# Patient Record
Sex: MALE | Race: WHITE | NOT HISPANIC OR LATINO | Employment: FULL TIME | URBAN - METROPOLITAN AREA
[De-identification: names, ages, dates, MRNs, and addresses within clinical notes are randomized per-mention and may not be internally consistent; named-entity substitution may affect disease eponyms.]

---

## 2020-11-19 PROBLEM — R10.9 ABDOMINAL BLOATING WITH CRAMPS: Status: ACTIVE | Noted: 2020-11-19

## 2020-11-19 PROBLEM — K21.9 GASTROESOPHAGEAL REFLUX DISEASE WITHOUT ESOPHAGITIS: Status: ACTIVE | Noted: 2020-11-19

## 2020-11-19 PROBLEM — J45.40 MODERATE PERSISTENT ASTHMA WITHOUT COMPLICATION: Status: ACTIVE | Noted: 2020-11-19

## 2020-11-19 PROBLEM — R14.0 ABDOMINAL BLOATING WITH CRAMPS: Status: ACTIVE | Noted: 2020-11-19

## 2020-11-19 PROBLEM — K62.5 RECTAL BLEEDING: Status: ACTIVE | Noted: 2020-11-19

## 2020-11-19 PROBLEM — J30.89 ENVIRONMENTAL AND SEASONAL ALLERGIES: Status: ACTIVE | Noted: 2020-11-19

## 2022-02-01 PROBLEM — K57.92 DIVERTICULITIS: Status: ACTIVE | Noted: 2022-02-01

## 2022-02-01 PROBLEM — R03.0 ELEVATED BLOOD-PRESSURE READING WITHOUT DIAGNOSIS OF HYPERTENSION: Status: ACTIVE | Noted: 2022-02-01

## 2022-02-01 PROBLEM — I10 ESSENTIAL HYPERTENSION: Status: ACTIVE | Noted: 2022-02-01

## 2022-02-01 PROBLEM — R05.9 COUGH: Status: ACTIVE | Noted: 2022-02-01

## 2022-02-02 PROBLEM — J45.909 ASTHMA: Chronic | Status: ACTIVE | Noted: 2022-02-02

## 2022-02-15 PROBLEM — R06.2 WHEEZING: Status: ACTIVE | Noted: 2022-02-15

## 2022-02-15 PROBLEM — M79.602 LEFT ARM PAIN: Status: ACTIVE | Noted: 2022-02-15

## 2022-02-21 PROBLEM — S66.912A MUSCLE STRAIN OF LEFT WRIST: Status: ACTIVE | Noted: 2022-02-21

## 2022-11-07 PROBLEM — L72.3 SEBACEOUS CYST: Status: ACTIVE | Noted: 2022-11-07

## 2022-11-07 PROBLEM — Z72.51 HIGH RISK SEXUAL BEHAVIOR: Status: ACTIVE | Noted: 2022-11-07

## 2022-11-07 PROBLEM — Z00.00 PREVENTATIVE HEALTH CARE: Status: ACTIVE | Noted: 2022-11-07

## 2022-11-07 PROBLEM — Z79.899 ENCOUNTER FOR LONG-TERM CURRENT USE OF MEDICATION: Status: ACTIVE | Noted: 2022-11-07

## 2023-02-06 PROBLEM — Z00.00 PREVENTATIVE HEALTH CARE: Status: RESOLVED | Noted: 2022-11-07 | Resolved: 2023-02-06

## 2023-03-24 PROBLEM — N50.82 SCROTAL PAIN: Status: ACTIVE | Noted: 2023-03-24

## 2024-01-12 ENCOUNTER — SOCIAL WORK (OUTPATIENT)
Dept: ADMINISTRATIVE | Facility: OTHER | Age: 39
End: 2024-01-12

## 2024-01-12 NOTE — PROGRESS NOTES
A referral has been received regarding medication assistance. CW called pt @ [phone] and [phone]. Left voice message notification for pt to return call.        Kassie Chase    [phone]     Catheter is inserted into the left ventricle. Pressures only